# Patient Record
Sex: FEMALE | Race: BLACK OR AFRICAN AMERICAN | NOT HISPANIC OR LATINO | URBAN - METROPOLITAN AREA
[De-identification: names, ages, dates, MRNs, and addresses within clinical notes are randomized per-mention and may not be internally consistent; named-entity substitution may affect disease eponyms.]

---

## 2023-09-13 ENCOUNTER — EMERGENCY (EMERGENCY)
Facility: HOSPITAL | Age: 28
LOS: 1 days | Discharge: HOME | End: 2023-09-13
Attending: EMERGENCY MEDICINE | Admitting: EMERGENCY MEDICINE
Payer: COMMERCIAL

## 2023-09-13 VITALS
OXYGEN SATURATION: 97 % | HEIGHT: 62 IN | HEART RATE: 82 BPM | RESPIRATION RATE: 18 BRPM | WEIGHT: 179.9 LBS | TEMPERATURE: 99 F | SYSTOLIC BLOOD PRESSURE: 118 MMHG | DIASTOLIC BLOOD PRESSURE: 79 MMHG

## 2023-09-13 VITALS
SYSTOLIC BLOOD PRESSURE: 113 MMHG | HEART RATE: 78 BPM | TEMPERATURE: 99 F | DIASTOLIC BLOOD PRESSURE: 87 MMHG | RESPIRATION RATE: 18 BRPM | OXYGEN SATURATION: 99 %

## 2023-09-13 DIAGNOSIS — R10.9 UNSPECIFIED ABDOMINAL PAIN: ICD-10-CM

## 2023-09-13 DIAGNOSIS — R14.0 ABDOMINAL DISTENSION (GASEOUS): ICD-10-CM

## 2023-09-13 DIAGNOSIS — R42 DIZZINESS AND GIDDINESS: ICD-10-CM

## 2023-09-13 DIAGNOSIS — R19.7 DIARRHEA, UNSPECIFIED: ICD-10-CM

## 2023-09-13 LAB
ALBUMIN SERPL ELPH-MCNC: 4 G/DL — SIGNIFICANT CHANGE UP (ref 3.3–5)
ALP SERPL-CCNC: 87 U/L — SIGNIFICANT CHANGE UP (ref 40–120)
ALT FLD-CCNC: 57 U/L — HIGH (ref 10–45)
ANION GAP SERPL CALC-SCNC: 11 MMOL/L — SIGNIFICANT CHANGE UP (ref 5–17)
APPEARANCE UR: CLEAR — SIGNIFICANT CHANGE UP
APTT BLD: 38 SEC — HIGH (ref 24.5–35.6)
AST SERPL-CCNC: 35 U/L — SIGNIFICANT CHANGE UP (ref 10–40)
BACTERIA # UR AUTO: PRESENT /HPF
BASOPHILS # BLD AUTO: 0.04 K/UL — SIGNIFICANT CHANGE UP (ref 0–0.2)
BASOPHILS NFR BLD AUTO: 0.8 % — SIGNIFICANT CHANGE UP (ref 0–2)
BILIRUB SERPL-MCNC: 0.5 MG/DL — SIGNIFICANT CHANGE UP (ref 0.2–1.2)
BILIRUB UR-MCNC: NEGATIVE — SIGNIFICANT CHANGE UP
BUN SERPL-MCNC: 10 MG/DL — SIGNIFICANT CHANGE UP (ref 7–23)
CALCIUM SERPL-MCNC: 9.7 MG/DL — SIGNIFICANT CHANGE UP (ref 8.4–10.5)
CHLORIDE SERPL-SCNC: 105 MMOL/L — SIGNIFICANT CHANGE UP (ref 96–108)
CO2 SERPL-SCNC: 23 MMOL/L — SIGNIFICANT CHANGE UP (ref 22–31)
COLOR SPEC: YELLOW — SIGNIFICANT CHANGE UP
CREAT SERPL-MCNC: 0.82 MG/DL — SIGNIFICANT CHANGE UP (ref 0.5–1.3)
DIFF PNL FLD: NEGATIVE — SIGNIFICANT CHANGE UP
EGFR: 99 ML/MIN/1.73M2 — SIGNIFICANT CHANGE UP
EOSINOPHIL # BLD AUTO: 0.03 K/UL — SIGNIFICANT CHANGE UP (ref 0–0.5)
EOSINOPHIL NFR BLD AUTO: 0.6 % — SIGNIFICANT CHANGE UP (ref 0–6)
EPI CELLS # UR: ABNORMAL /HPF (ref 0–5)
GLUCOSE SERPL-MCNC: 89 MG/DL — SIGNIFICANT CHANGE UP (ref 70–99)
GLUCOSE UR QL: NEGATIVE — SIGNIFICANT CHANGE UP
HCG UR QL: NEGATIVE — SIGNIFICANT CHANGE UP
HCT VFR BLD CALC: 38.5 % — SIGNIFICANT CHANGE UP (ref 34.5–45)
HGB BLD-MCNC: 13.1 G/DL — SIGNIFICANT CHANGE UP (ref 11.5–15.5)
IMM GRANULOCYTES NFR BLD AUTO: 0.2 % — SIGNIFICANT CHANGE UP (ref 0–0.9)
INR BLD: 1.07 — SIGNIFICANT CHANGE UP (ref 0.85–1.18)
KETONES UR-MCNC: 15 MG/DL
LEUKOCYTE ESTERASE UR-ACNC: ABNORMAL
LYMPHOCYTES # BLD AUTO: 1.29 K/UL — SIGNIFICANT CHANGE UP (ref 1–3.3)
LYMPHOCYTES # BLD AUTO: 24.2 % — SIGNIFICANT CHANGE UP (ref 13–44)
MAGNESIUM SERPL-MCNC: 2 MG/DL — SIGNIFICANT CHANGE UP (ref 1.6–2.6)
MCHC RBC-ENTMCNC: 27.6 PG — SIGNIFICANT CHANGE UP (ref 27–34)
MCHC RBC-ENTMCNC: 34 GM/DL — SIGNIFICANT CHANGE UP (ref 32–36)
MCV RBC AUTO: 81.1 FL — SIGNIFICANT CHANGE UP (ref 80–100)
MONOCYTES # BLD AUTO: 0.43 K/UL — SIGNIFICANT CHANGE UP (ref 0–0.9)
MONOCYTES NFR BLD AUTO: 8.1 % — SIGNIFICANT CHANGE UP (ref 2–14)
NEUTROPHILS # BLD AUTO: 3.53 K/UL — SIGNIFICANT CHANGE UP (ref 1.8–7.4)
NEUTROPHILS NFR BLD AUTO: 66.1 % — SIGNIFICANT CHANGE UP (ref 43–77)
NITRITE UR-MCNC: NEGATIVE — SIGNIFICANT CHANGE UP
NRBC # BLD: 0 /100 WBCS — SIGNIFICANT CHANGE UP (ref 0–0)
PH UR: 5.5 — SIGNIFICANT CHANGE UP (ref 5–8)
PHOSPHATE SERPL-MCNC: 3.4 MG/DL — SIGNIFICANT CHANGE UP (ref 2.5–4.5)
PLATELET # BLD AUTO: 421 K/UL — HIGH (ref 150–400)
POTASSIUM SERPL-MCNC: 4 MMOL/L — SIGNIFICANT CHANGE UP (ref 3.5–5.3)
POTASSIUM SERPL-SCNC: 4 MMOL/L — SIGNIFICANT CHANGE UP (ref 3.5–5.3)
PROT SERPL-MCNC: 7.9 G/DL — SIGNIFICANT CHANGE UP (ref 6–8.3)
PROT UR-MCNC: NEGATIVE MG/DL — SIGNIFICANT CHANGE UP
PROTHROM AB SERPL-ACNC: 12.2 SEC — SIGNIFICANT CHANGE UP (ref 9.5–13)
RBC # BLD: 4.75 M/UL — SIGNIFICANT CHANGE UP (ref 3.8–5.2)
RBC # FLD: 13.8 % — SIGNIFICANT CHANGE UP (ref 10.3–14.5)
RBC CASTS # UR COMP ASSIST: < 5 /HPF — SIGNIFICANT CHANGE UP
SODIUM SERPL-SCNC: 139 MMOL/L — SIGNIFICANT CHANGE UP (ref 135–145)
SP GR SPEC: 1.01 — SIGNIFICANT CHANGE UP (ref 1–1.03)
UROBILINOGEN FLD QL: 0.2 E.U./DL — SIGNIFICANT CHANGE UP
WBC # BLD: 5.33 K/UL — SIGNIFICANT CHANGE UP (ref 3.8–10.5)
WBC # FLD AUTO: 5.33 K/UL — SIGNIFICANT CHANGE UP (ref 3.8–10.5)
WBC UR QL: < 5 /HPF — SIGNIFICANT CHANGE UP

## 2023-09-13 PROCEDURE — 36415 COLL VENOUS BLD VENIPUNCTURE: CPT

## 2023-09-13 PROCEDURE — 82962 GLUCOSE BLOOD TEST: CPT

## 2023-09-13 PROCEDURE — 99284 EMERGENCY DEPT VISIT MOD MDM: CPT

## 2023-09-13 PROCEDURE — 85730 THROMBOPLASTIN TIME PARTIAL: CPT

## 2023-09-13 PROCEDURE — 80053 COMPREHEN METABOLIC PANEL: CPT

## 2023-09-13 PROCEDURE — 83735 ASSAY OF MAGNESIUM: CPT

## 2023-09-13 PROCEDURE — 85025 COMPLETE CBC W/AUTO DIFF WBC: CPT

## 2023-09-13 PROCEDURE — 85610 PROTHROMBIN TIME: CPT

## 2023-09-13 PROCEDURE — 84100 ASSAY OF PHOSPHORUS: CPT

## 2023-09-13 PROCEDURE — 93005 ELECTROCARDIOGRAM TRACING: CPT

## 2023-09-13 PROCEDURE — 81025 URINE PREGNANCY TEST: CPT

## 2023-09-13 PROCEDURE — 99283 EMERGENCY DEPT VISIT LOW MDM: CPT | Mod: 25

## 2023-09-13 PROCEDURE — 81001 URINALYSIS AUTO W/SCOPE: CPT

## 2023-09-13 PROCEDURE — 87086 URINE CULTURE/COLONY COUNT: CPT

## 2023-09-13 PROCEDURE — 93010 ELECTROCARDIOGRAM REPORT: CPT

## 2023-09-13 RX ORDER — SODIUM CHLORIDE 9 MG/ML
1000 INJECTION INTRAMUSCULAR; INTRAVENOUS; SUBCUTANEOUS ONCE
Refills: 0 | Status: COMPLETED | OUTPATIENT
Start: 2023-09-13 | End: 2023-09-13

## 2023-09-13 RX ADMIN — SODIUM CHLORIDE 1000 MILLILITER(S): 9 INJECTION INTRAMUSCULAR; INTRAVENOUS; SUBCUTANEOUS at 14:13

## 2023-09-13 NOTE — ED ADULT NURSE NOTE - NSFALLHARMRISKINTERV_ED_ALL_ED

## 2023-09-13 NOTE — ED PROVIDER NOTE - OBJECTIVE STATEMENT
28-year-old here after feeling lightheaded at home today feeling as if she would faint, no chest pain or palpitations associated with this, of note patient went to urgent care on August 23 with palpitations and had a reportedly normal EKG at that time, then landed at [facility] on August 28 after feeling nauseous and having diarrhea, reports she received IV fluids and Zofran and told she had a viral infection and was discharged, since that visit to the ER patient has had daily diarrhea which has improved but still occurs about 2 times a day, has been able to advance her diet to a normal diet, however has had a diminished appetite, no fevers recently however had 100 fever on the 28th which has not reoccurred, no urinary symptoms, no vaginal discharge and patient is not sexually active, no black or bloody stools, no vomiting, no history of recent antibiotics or other infections.  Patient saw her PCP this Friday and was given pantoprazole which she took only 1 time and reports she did not feel well a few hours later and thought it might be due to the medication so she discontinued this.

## 2023-09-13 NOTE — ED PROVIDER NOTE - PRINCIPAL DIAGNOSIS
SUBJECTIVE    This is a 35 sertraline (ZOLOFT) 100 MG tablet Take 150 mg by mouth daily.     • rimegepant sulfate (NURTEC ODT) 75 MG disintegrating tablet Take 75 mg by mouth daily as needed.     • traZODone (DESYREL) 100 MG tablet Take 200 mg by mouth.     • levonorgestrel (Mirena, 52 MG,) (52 MG) 20 MCG/DAY intrauterine device      • Galcanezumab-gnlm (EMGALITY SC) Inject into the skin every 30 days.     • topiramate (TOPAMAX) 100 MG tablet Take 100 mg by mouth daily. 1 tab (=100mg)     • Probiotic Product (PROBIOTIC PO) Take 1 Dose by mouth daily.      • polyethylene glycol (MIRALAX) powder Take 17 g by mouth daily as needed (constipation).          HISTORIES  I have personally reviewed and updated the following EMR sections:  Allergies, Problem List, Past Medical History, Past Surgical History and Social History    OBJECTIVE    Blood pressure 122/79, pulse 70, temperature 97.7 °F (36.5 °C), temperature source Oral, resp. rate 12, height 6' 2\" (1.88 m), weight (!) 154.6 kg (340 lb 13.3 oz), last menstrual period 12/05/2022, SpO2 97 %.  Body mass index is 43.76 kg/m².  General:  Alert and oriented x 3, well groomed, not in any acute distress.  HEENT:  Head normocephalic, atraumatic.  Pupils equal, round, reactive to light, extraocular movements intact, oral and nasal mucosa pink and moist. Tympanic membranes pearly gray bilaterally, slight erythema to left external auditory canal as compared to right external auditory canal, posterior oropharynx visualized and there is no exudate or erythema. No pain with palpation over the sinuses.  NECK:  Supple, no lymphadenopathy.  LUNGS:  Clear to auscultation bilaterally, chest movement is symmetric, patient is in no respiratory distress.  CARDIOVASCULAR:  Regular rate and rhythm, no murmurs, rubs or gallops.    LABORATORY  I have reviewed and analyzed the pertinent laboratory tests.  These are the pertinent findings:  · none    IMAGING  I have reviewed and analyzed the pertinent imaging study  reports.  These are the pertinent findings:  · none      ASSESSMENT/PLAN  Itching of ear  - neomycin-polymyxin-hydroCORTisone (CORTISPORIN) [id number] Danii Solomon MD     Abdominal pain

## 2023-09-13 NOTE — ED ADULT NURSE NOTE - OBJECTIVE STATEMENT
28yoF came to ED c/o feeling light headed on and off for three weeks. Pt states she originally felt lightheaded 3 weeks ago went to the hospital and they discharged her.  Since then she quit caffeine and had been drinking lot of water but has still been experiencing episodes of light headedness. Pt states " Today I was trying to work and just couldn't lift my head up". Pt states she has had migraines in the past, never seen a neurologist. States the light helps. Denies LOC, blurry vision, and HA.

## 2023-09-13 NOTE — ED ADULT TRIAGE NOTE - CHIEF COMPLAINT QUOTE
pt c/o lightheadedness since around 10AM this morning. pt states "I feel like I'm going to pass out." denies any LOC or pain.

## 2023-09-15 LAB
CULTURE RESULTS: SIGNIFICANT CHANGE UP
SPECIMEN SOURCE: SIGNIFICANT CHANGE UP

## 2023-10-30 PROBLEM — Z78.9 OTHER SPECIFIED HEALTH STATUS: Chronic | Status: ACTIVE | Noted: 2023-09-13

## 2023-11-10 ENCOUNTER — APPOINTMENT (OUTPATIENT)
Dept: OTOLARYNGOLOGY | Facility: CLINIC | Age: 28
End: 2023-11-10

## 2023-11-10 PROBLEM — Z00.00 ENCOUNTER FOR PREVENTIVE HEALTH EXAMINATION: Status: ACTIVE | Noted: 2023-11-10

## 2023-12-29 ENCOUNTER — OFFICE (OUTPATIENT)
Dept: URBAN - METROPOLITAN AREA CLINIC 28 | Facility: CLINIC | Age: 28
Setting detail: OPHTHALMOLOGY
End: 2023-12-29
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
## 2023-12-29 PROCEDURE — NO SHOW FE NO SHOW FEE: Performed by: OPHTHALMOLOGY

## 2024-01-05 ENCOUNTER — APPOINTMENT (OUTPATIENT)
Dept: OTOLARYNGOLOGY | Facility: CLINIC | Age: 29
End: 2024-01-05
Payer: COMMERCIAL

## 2024-01-05 VITALS
BODY MASS INDEX: 38.64 KG/M2 | TEMPERATURE: 97.3 F | WEIGHT: 210 LBS | SYSTOLIC BLOOD PRESSURE: 119 MMHG | DIASTOLIC BLOOD PRESSURE: 80 MMHG | HEIGHT: 62 IN | HEART RATE: 102 BPM

## 2024-01-05 DIAGNOSIS — Z86.2 PERSONAL HISTORY OF DISEASES OF THE BLOOD AND BLOOD-FORMING ORGANS AND CERTAIN DISORDERS INVOLVING THE IMMUNE MECHANISM: ICD-10-CM

## 2024-01-05 DIAGNOSIS — Z83.3 FAMILY HISTORY OF DIABETES MELLITUS: ICD-10-CM

## 2024-01-05 DIAGNOSIS — Z86.59 PERSONAL HISTORY OF OTHER MENTAL AND BEHAVIORAL DISORDERS: ICD-10-CM

## 2024-01-05 DIAGNOSIS — Z83.79 FAMILY HISTORY OF OTHER DISEASES OF THE DIGESTIVE SYSTEM: ICD-10-CM

## 2024-01-05 DIAGNOSIS — Z78.9 OTHER SPECIFIED HEALTH STATUS: ICD-10-CM

## 2024-01-05 DIAGNOSIS — Z87.19 PERSONAL HISTORY OF OTHER DISEASES OF THE DIGESTIVE SYSTEM: ICD-10-CM

## 2024-01-05 DIAGNOSIS — R42 DIZZINESS AND GIDDINESS: ICD-10-CM

## 2024-01-05 PROCEDURE — 99204 OFFICE O/P NEW MOD 45 MIN: CPT

## 2024-01-05 NOTE — ASSESSMENT
[FreeTextEntry1] : - 1/5/2024 28-year-old female who presents with concern for 3 months of dizziness.  I am not entirely sure that this is otologic in nature.  I was unable to elicit symptomatology today on testing including [place]-Hallpike maneuver.  At this time I am recommending audiogram and tympanogram as well as VNG.  Patient will This completed and follow-up after to review those results.  I also recommended continued follow-up with neurology and to complete the MRI as ordered.  Patient to follow-up after the above testing, sooner should symptoms worsen or fail to improve.  - Audiogram and tympanogram, VNG - Continue follow-up with neurology, MRI is ordered - Follow-up after the above, sooner should symptoms worsen or fail to improve

## 2024-01-05 NOTE — PHYSICAL EXAM
[Normal] : mucosa is normal [Midline] : trachea located in midline position [] : [place]-Hallpike test is negative

## 2024-01-05 NOTE — HISTORY OF PRESENT ILLNESS
[de-identified] : 1/5/24 27 yo female who presents with daily dizziness x 3 months.  She will experience weak and a room spinning sensation.  She will have imbalance as well, especially when walking.  +lightheadedness.  No falls or loss of consciousness.  this will occur daily and will start around 10am lasting until bedside.  It goes away when lying supine.  When symtomatic it can come and go.  No change in hearing.  No tinnitus.  No otorrhea.  +otalgia on left that feels like stabbing sensation.  +qtip use.  +loud noise exposure from listening to music.  Nothing like this has happened previously.  Patient admits to poor sleep.  She also has increased stress and anxiety over the past few months.  She does work with a therapist.  No alcohol use and no caffeine use.  She is already been seen by a neurologist.  Doppler ultrasounds of the neck have been normal per patient.  MRI ordered but not done due to anxiety/claustrophobia. No ENT issues otherwise.

## 2024-01-26 ENCOUNTER — APPOINTMENT (OUTPATIENT)
Dept: OTOLARYNGOLOGY | Facility: CLINIC | Age: 29
End: 2024-01-26

## 2024-01-29 ENCOUNTER — APPOINTMENT (OUTPATIENT)
Dept: OTOLARYNGOLOGY | Facility: CLINIC | Age: 29
End: 2024-01-29

## 2024-01-29 NOTE — PHYSICAL EXAM
[Normal] : mucosa is normal [Midline] : trachea located in midline position [] : [place]-Hallpike test is negative

## 2024-01-29 NOTE — HISTORY OF PRESENT ILLNESS
[de-identified] : 1/5/24 27 yo female who presents with daily dizziness x 3 months.  She will experience weak and a room spinning sensation.  She will have imbalance as well, especially when walking.  +lightheadedness.  No falls or loss of consciousness.  this will occur daily and will start around 10am lasting until bedside.  It goes away when lying supine.  When symtomatic it can come and go.  No change in hearing.  No tinnitus.  No otorrhea.  +otalgia on left that feels like stabbing sensation.  +qtip use.  +loud noise exposure from listening to music.  Nothing like this has happened previously.  Patient admits to poor sleep.  She also has increased stress and anxiety over the past few months.  She does work with a therapist.  No alcohol use and no caffeine use.  She is already been seen by a neurologist.  Doppler ultrasounds of the neck have been normal per patient.  MRI ordered but not done due to anxiety/claustrophobia. No ENT issues otherwise.  - [FreeTextEntry1] : 1/26/24: Patient completed audiogram/ tympanogram and VNG and is here to review results.

## 2024-01-29 NOTE — ASSESSMENT
[FreeTextEntry1] : - 1/5/2024 28-year-old female who presents with concern for 3 months of dizziness.  I am not entirely sure that this is otologic in nature.  I was unable to elicit symptomatology today on testing including [place]-Hallpike maneuver.  At this time I am recommending audiogram and tympanogram as well as VNG.  Patient will This completed and follow-up after to review those results.  I also recommended continued follow-up with neurology and to complete the MRI as ordered.  Patient to follow-up after the above testing, sooner should symptoms worsen or fail to improve.  1/26/24:   - Audiogram and tympanogram, VNG - Continue follow-up with neurology, MRI is ordered - Follow-up after the above, sooner should symptoms worsen or fail to improve

## 2024-05-16 NOTE — ED PROVIDER NOTE - QRS
Pt requesting refill for rosuvastatin (CRESTOR) 40 MG Tab and albuterol (PROVENTIL/VENTOLIN HFA) 90 mcg/actuation inhaler     LOV: 2/8/24    NOV: 7/9/24     78